# Patient Record
Sex: FEMALE | Race: WHITE | NOT HISPANIC OR LATINO | Employment: OTHER | URBAN - METROPOLITAN AREA
[De-identification: names, ages, dates, MRNs, and addresses within clinical notes are randomized per-mention and may not be internally consistent; named-entity substitution may affect disease eponyms.]

---

## 2023-08-01 ENCOUNTER — EVALUATION (OUTPATIENT)
Dept: PHYSICAL THERAPY | Facility: CLINIC | Age: 73
End: 2023-08-01
Payer: MEDICARE

## 2023-08-01 DIAGNOSIS — M25.511 ACUTE PAIN OF RIGHT SHOULDER: Primary | ICD-10-CM

## 2023-08-01 DIAGNOSIS — S46.011D STRAIN OF RIGHT ROTATOR CUFF CAPSULE, SUBSEQUENT ENCOUNTER: ICD-10-CM

## 2023-08-01 DIAGNOSIS — M89.9 SCAPULAR DYSFUNCTION: ICD-10-CM

## 2023-08-01 PROCEDURE — 97110 THERAPEUTIC EXERCISES: CPT

## 2023-08-01 PROCEDURE — 97161 PT EVAL LOW COMPLEX 20 MIN: CPT

## 2023-08-01 NOTE — PROGRESS NOTES
PT Evaluation     Today's date: 2023  Patient name: Lars Francis  : 1950  MRN: [id number]  Referring provider: Anel Green MD  Dx:   Encounter Diagnosis     ICD-10-CM    1. Acute pain of right shoulder  M25.511       2. Scapular dysfunction  M89.9       3. Strain of right rotator cuff capsule, subsequent encounter  S46.011D           Start Time: 1315  Stop Time: 1415  Total time in clinic (min): 60 minutes    Assessment  Assessment details: Lars Francis is a 71 y.o. female present to PT with CC of right shoulder pain beginning ~two weeks ago after playing tennis. Patient is an active individual who enjoys tennis, pickleball, golfing, and biking. Key findings from the physical exam include moderate scapular winging on the R, pain with OH motions, and positive R Lift-off test. The patient's presentation is consistent with scapular dyskinesis due to scapular winging resulting in minor rotator cuff tendonitis. The patient's current condition results in the below impairments and functional impairments, most notably impeding the patient's ability to perform activities around the house and engage in desired recreational sports. Therefore, the patient would benefit from skilled PT to strengthen and stabilize the shoulder/periscapular musculature to improve scapular mobility resulting in improved scapular humeral rhythm and reducing impingement/risk of future injury. The patient was educated on current diagnosis, prognosis, POC, and HEP.      Impairments: abnormal coordination, abnormal or restricted ROM, abnormal movement, impaired physical strength, lacks appropriate home exercise program, pain with function, scapular dyskinesis, poor posture  and poor body mechanics  Functional limitations: inability to play tennis; inability to perform OH motions; inability to open/close car door; inability to ride bike comfortably; inability to get comfortable at night; difficulty with household chores; difficulty reaching items off shelf;   Symptom irritability: lowUnderstanding of Dx/Px/POC: good   Prognosis: good    Goals  ST. In one week, the patient will demo independence with HEP to optimize patient outcomes b/t sessions. 2. In two weeks the patient will be able to tolerate R shoulder abduction as noted by no increase in pain (<2/10) to promote ability to return to reaching/OH motions. 3. In four weeks, patient will demo independence with postural awareness as observed by decreased verbal/tactile cueing needed throughout the session in order to perform exercises with proper form and improve carry-over while at home  4. In four weeks, patient will improve ability to perform R shoulder IR as demonstrated by pain (<1/10) or weakness on R side compared to L to improve ability to reach in back seat of car/reach behind self. LTG: (6 weeks)  1. By discharge, patient will be able to perform OH motions with improved shoulder mechanics and coordination as observed my minimal or less scapular winging. 2. By discharge, patient will be able to perform heavy household chores with less difficulty as measured by improved FOTO score of no difficulty. 3. By discharge, patient will be able to reach an object that is shoulder height with less difficulty as measured by improved FOTO score of no difficulty. 4. By discharge, patient will be able to perform tennis rafat/backhand repeatedly with no pain to improve ability to return to desired activities.     Plan  Patient would benefit from: PT eval and skilled physical therapy  Referral necessary: No  Planned modality interventions: cryotherapy, TENS and thermotherapy: hydrocollator packs  Planned therapy interventions: joint mobilization, activity modification, ADL training, manual therapy, massage, Betts taping, motor coordination training, body mechanics training, neuromuscular re-education, patient education, postural training, strengthening, stretching, therapeutic activities, therapeutic exercise, therapeutic training, graded activity, graded exercise, graded motor, home exercise program, coordination, flexibility and functional ROM exercises  Frequency: 2x week  Duration in weeks: 6  Treatment plan discussed with: patient        Subjective Evaluation    History of Present Illness  Date of onset: 2023  Mechanism of injury: Pt reports the pain began two Sundays ago while playing tennis. The patient explains she was playing tennis and serving vigorously. The patient enjoys playing tennis ~4x/week and has started to play pickleball. The patient describes that later that evening she felt fine, but when she woke up Monday morning she was unable to lift her arm (abduct) and had difficulty closing a car door due to pain and arm feeling like it would not complete the movement. The patient reports waiting a week to see if the pain would resolve on its own, but eventually saw Dr. Mariama Dorantes at [facility] where she received an anti-inflammatory injection to the R shoulder. The patient has felt no relief from the shot. In addition, this past  the patient hit tennis balls for ~20 minutes with ball machine and reports immediate pain that she powered through. The patient's current presentation includes pain with lifting the arm, occasional clicking and catching, discomfort at night due to sleeping on it, and inability to play tennis pain free or at full capacity.                  Not a recurrent problem   Patient Goals  Patient goals for therapy: return to sport/leisure activities, increased motion, decreased pain, increased strength and independence with ADLs/IADLs  Patient goal: Return to tennis; return to pickle ball; return to riding bike pain-free  Pain  Current pain ratin  At best pain ratin  At worst pain ratin  Location: on lateral arm; through R shoulder joint    Quality: sharp (feels out of place)  Relieving factors: change in position, rest and support (Stretching )  Aggravating factors: lifting and overhead activity    Social Support  Steps to enter house: yes  2  Stairs in house: yes   13  Lives in: multiple-level home  Lives with: spouse ( and dogs)    Employment status: not working  Hand dominance: right    Treatments  Previous treatment: injection treatment  Current treatment: injection treatment and physical therapy        Objective     Static Posture     Head  Forward. Scapulae  Right anteriorly tipped and right depressed. Postural Observations  Seated posture: fair  Standing posture: fair        Observations     Additional Observation Details  Right shoulder presents slightly more depressed than L in sitting    Palpation     Right   No palpable tenderness to the biceps, infraspinatus, middle deltoid, supraspinatus, teres major, teres minor and triceps. Tenderness     Right Shoulder  No tenderness     Cervical/Thoracic Screen   Cervical range of motion within normal limits  Thoracic range of motion within normal limits    Active Range of Motion   Left Shoulder   Normal active range of motion    Right Shoulder   Flexion: WFL  Extension: WFL  Abduction: with pain  External rotation 90°: WFL  External rotation BTH: WFL  Internal rotation 90°: WFL  Internal rotation BTB: with pain    Passive Range of Motion     Right Shoulder   Abduction: Right shoulder passive abduction: Tightness; observable clicking. Scapular Mobility     Right Shoulder   Scapular Dyskinesis: grade I  Scapular mobility: poor  Scapular Mobility with Shoulder to 90° FF   Scapular winging: moderate    Scapular Mobility beyond 90° FF   Scapular winging: moderate    Joint Play     Right Shoulder  Joints within functional limits are the anterior capsule and inferior capsule. Hypomobile in the posterior capsule.      Strength/Myotome Testing     Left Shoulder   Normal muscle strength    Right Shoulder     Planes of Motion   Flexion: 4+   Abduction: 4+   External rotation at 0°: 4+     Tests     Right Shoulder   Positive lift-off and scapular assistance . Negative empty can, full can, Hawkin's, horn blower, painful arc and Speed's. Precautions: History reviewed. No pertinent past medical history. HEP:   Access Code: [email or web address]       Patient treated by ASHLEY Mcallister under my direct supervision.     Manuals 8/1/23            Visit IE            PA mobilizations             PROM                          Neuro Re-Ed             Rows YTB HEP VC/TC            Shoulder Extension  YTB HEP VC/TC            Isometrics              Resisted IR/ER             [employer]              Touch-downs              Planks             Shoulder Perturbations              Ther Ex             IR cane stretch  HEP 5x10"            Doorway stretch                                                                                           Ther Activity                                       Gait Training                                       Modalities

## 2023-08-01 NOTE — LETTER
2023    Anel Green MD  [address]    Patient: Lars Francis   YOB: 1950   Date of Visit: 2023     Encounter Diagnosis     ICD-10-CM    1. Acute pain of right shoulder  M25.511       2. Scapular dysfunction  M89.9       3. Strain of right rotator cuff capsule, subsequent encounter  S46.011D           Dear Dr. Green: Thank you for your recent referral of Lars Francis. Please review the attached evaluation summary from Hazel's recent visit. Please verify that you agree with the plan of care by signing the attached order. If you have any questions or concerns, please do not hesitate to call. I sincerely appreciate the opportunity to share in the care of one of your patients and hope to have another opportunity to work with you in the near future. Sincerely,    Renan Plummer, PT      Referring Provider:      I certify that I have read the below Plan of Care and certify the need for these services furnished under this plan of treatment while under my care. Anel Green MD  [address]  Via Fax: [phone]          PT Evaluation     Today's date: 2023  Patient name: Lars Francis  : 1950  MRN: [id number]  Referring provider: Anel Green MD  Dx:   Encounter Diagnosis     ICD-10-CM    1. Acute pain of right shoulder  M25.511       2. Scapular dysfunction  M89.9       3. Strain of right rotator cuff capsule, subsequent encounter  S46.011D           Start Time: 1315  Stop Time: 1415  Total time in clinic (min): 60 minutes    Assessment  Assessment details: Lars Francis is a 71 y.o. female present to PT with CC of right shoulder pain beginning ~two weeks ago after playing tennis. Patient is an active individual who enjoys tennis, pickleball, golfing, and biking.  Key findings from the physical exam include moderate scapular winging on the R, pain with OH motions, and positive R Lift-off test. The patient's presentation is consistent with scapular dyskinesis due to scapular winging resulting in minor rotator cuff tendonitis. The patient's current condition results in the below impairments and functional impairments, most notably impeding the patient's ability to perform activities around the house and engage in desired recreational sports. Therefore, the patient would benefit from skilled PT to strengthen and stabilize the shoulder/periscapular musculature to improve scapular mobility resulting in improved scapular humeral rhythm and reducing impingement/risk of future injury. The patient was educated on current diagnosis, prognosis, POC, and HEP. Impairments: abnormal coordination, abnormal or restricted ROM, abnormal movement, impaired physical strength, lacks appropriate home exercise program, pain with function, scapular dyskinesis, poor posture  and poor body mechanics  Functional limitations: inability to play tennis; inability to perform OH motions; inability to open/close car door; inability to ride bike comfortably; inability to get comfortable at night; difficulty with household chores; difficulty reaching items off shelf;   Symptom irritability: lowUnderstanding of Dx/Px/POC: good   Prognosis: good    Goals  ST. In one week, the patient will demo independence with HEP to optimize patient outcomes b/t sessions. 2. In two weeks the patient will be able to tolerate R shoulder abduction as noted by no increase in pain (<2/10) to promote ability to return to reaching/OH motions. 3. In four weeks, patient will demo independence with postural awareness as observed by decreased verbal/tactile cueing needed throughout the session in order to perform exercises with proper form and improve carry-over while at home  4.  In four weeks, patient will improve ability to perform R shoulder IR as demonstrated by pain (<1/10) or weakness on R side compared to L to improve ability to reach in back seat of car/reach behind self. LTG: (6 weeks)  1. By discharge, patient will be able to perform OH motions with improved shoulder mechanics and coordination as observed my minimal or less scapular winging. 2. By discharge, patient will be able to perform heavy household chores with less difficulty as measured by improved FOTO score of no difficulty. 3. By discharge, patient will be able to reach an object that is shoulder height with less difficulty as measured by improved FOTO score of no difficulty. 4. By discharge, patient will be able to perform tennis rafat/backhand repeatedly with no pain to improve ability to return to desired activities. Plan  Patient would benefit from: PT eval and skilled physical therapy  Referral necessary: No  Planned modality interventions: cryotherapy, TENS and thermotherapy: hydrocollator packs  Planned therapy interventions: joint mobilization, activity modification, ADL training, manual therapy, massage, Betts taping, motor coordination training, body mechanics training, neuromuscular re-education, patient education, postural training, strengthening, stretching, therapeutic activities, therapeutic exercise, therapeutic training, graded activity, graded exercise, graded motor, home exercise program, coordination, flexibility and functional ROM exercises  Frequency: 2x week  Duration in weeks: 6  Treatment plan discussed with: patient        Subjective Evaluation    History of Present Illness  Date of onset: 7/23/2023  Mechanism of injury: Pt reports the pain began two Sundays ago while playing tennis. The patient explains she was playing tennis and serving vigorously. The patient enjoys playing tennis ~4x/week and has started to play pickleball. The patient describes that later that evening she felt fine, but when she woke up Monday morning she was unable to lift her arm (abduct) and had difficulty closing a car door due to pain and arm feeling like it would not complete the movement. The patient reports waiting a week to see if the pain would resolve on its own, but eventually saw Dr. Noemi Baltazar at [facility] where she received an anti-inflammatory injection to the R shoulder. The patient has felt no relief from the shot. In addition, this past  the patient hit tennis balls for ~20 minutes with ball machine and reports immediate pain that she powered through. The patient's current presentation includes pain with lifting the arm, occasional clicking and catching, discomfort at night due to sleeping on it, and inability to play tennis pain free or at full capacity. Not a recurrent problem   Patient Goals  Patient goals for therapy: return to sport/leisure activities, increased motion, decreased pain, increased strength and independence with ADLs/IADLs  Patient goal: Return to tennis; return to pickle ball; return to riding bike pain-free  Pain  Current pain ratin  At best pain ratin  At worst pain ratin  Location: on lateral arm; through R shoulder joint    Quality: sharp (feels out of place)  Relieving factors: change in position, rest and support (Stretching )  Aggravating factors: lifting and overhead activity    Social Support  Steps to enter house: yes  2  Stairs in house: yes   13  Lives in: multiple-level home  Lives with: spouse ( and dogs)    Employment status: not working  Hand dominance: right    Treatments  Previous treatment: injection treatment  Current treatment: injection treatment and physical therapy        Objective     Static Posture     Head  Forward. Scapulae  Right anteriorly tipped and right depressed. Postural Observations  Seated posture: fair  Standing posture: fair        Observations     Additional Observation Details  Right shoulder presents slightly more depressed than L in sitting    Palpation     Right   No palpable tenderness to the biceps, infraspinatus, middle deltoid, supraspinatus, teres major, teres minor and triceps. Tenderness     Right Shoulder  No tenderness     Cervical/Thoracic Screen   Cervical range of motion within normal limits  Thoracic range of motion within normal limits    Active Range of Motion   Left Shoulder   Normal active range of motion    Right Shoulder   Flexion: WFL  Extension: WFL  Abduction: with pain  External rotation 90°: WFL  External rotation BTH: WFL  Internal rotation 90°: WFL  Internal rotation BTB: with pain    Passive Range of Motion     Right Shoulder   Abduction: Right shoulder passive abduction: Tightness; observable clicking. Scapular Mobility     Right Shoulder   Scapular Dyskinesis: grade I  Scapular mobility: poor  Scapular Mobility with Shoulder to 90° FF   Scapular winging: moderate    Scapular Mobility beyond 90° FF   Scapular winging: moderate    Joint Play     Right Shoulder  Joints within functional limits are the anterior capsule and inferior capsule. Hypomobile in the posterior capsule. Strength/Myotome Testing     Left Shoulder   Normal muscle strength    Right Shoulder     Planes of Motion   Flexion: 4+   Abduction: 4+   External rotation at 0°: 4+     Tests     Right Shoulder   Positive lift-off and scapular assistance . Negative empty can, full can, Hawkin's, horn blower, painful arc and Speed's. Precautions: History reviewed. No pertinent past medical history. HEP:   Access Code: [email or web address]       Patient treated by ASHLEY Gomez under my direct supervision.     Manuals 8/1/23            Visit IE            PA mobilizations             PROM                          Neuro Re-Ed             Rows YTB HEP VC/TC            Shoulder Extension  YTB HEP VC/TC            Isometrics              Resisted IR/ER             [employer] [facility] [place]              Touch-downs              Planks             Shoulder Perturbations              Ther Ex             IR cane stretch  HEP 5x10"            Doorway stretch Ther Activity                                       Gait Training                                       Modalities

## 2023-08-03 ENCOUNTER — OFFICE VISIT (OUTPATIENT)
Dept: PHYSICAL THERAPY | Facility: CLINIC | Age: 73
End: 2023-08-03
Payer: MEDICARE

## 2023-08-03 DIAGNOSIS — M25.511 ACUTE PAIN OF RIGHT SHOULDER: Primary | ICD-10-CM

## 2023-08-03 DIAGNOSIS — S46.011D STRAIN OF RIGHT ROTATOR CUFF CAPSULE, SUBSEQUENT ENCOUNTER: ICD-10-CM

## 2023-08-03 DIAGNOSIS — M89.9 SCAPULAR DYSFUNCTION: ICD-10-CM

## 2023-08-03 PROCEDURE — 97112 NEUROMUSCULAR REEDUCATION: CPT

## 2023-08-03 NOTE — PROGRESS NOTES
Daily Note     Today's date: 8/3/2023  Patient name: Filippo Cisse  : 1950  MRN: [id number]  Referring provider: Sosa Green MD  Dx:   Encounter Diagnosis     ICD-10-CM    1. Acute pain of right shoulder  M25.511       2. Scapular dysfunction  M89.9       3. Strain of right rotator cuff capsule, subsequent encounter  S46.011D           Start Time: 1400  Stop Time: 1447  Total time in clinic (min): 47 minutes    Subjective: Patient reports feeling shoulder feels the same with not too much pain and reports of it getting stuck during elevation motions. The patient noted some soreness in her back/between shoulder blades. Patient felt HEP was good and that her muscles were tired, but there was no pain. Objective: See treatment diary below      Assessment: Tolerated treatment well as demonstrated by ability to progress shoulder and periscapular interventions without any pain. The patient continued to demo notable scapular winging and impaired coordination on the R shoulder compared to the L. In addition, the patient required verbal and tactile cueing to engage middle/lower trap and serratus anterior throughout session. Patient demonstrated fatigue post treatment and would benefit from continued PT to improve shoulder girdle mechanics and coordination, especially in OH ranges, needed for ADLs and safe participation in desired recreational activities. Plan: Continue per plan of care. Progress treatment as tolerated. Precautions: History reviewed. No pertinent past medical history. HEP:   Access Code: [email or web address]   - updated on 8/3/23      Patient treated by ASHLEY Shahid under my direct supervision.     Manuals 8/3/23 8/1/23     Visit 2 IE     PA mobilizations       PROM              Neuro Re-Ed       Rows YTB 3x10 VC/TC YTB HEP VC/TC     Shoulder Extension   YTB HEP VC/TC     Isometrics        Resisted IR/ER ER YTB 3x10 (towel)      [place]  Unweighted 10x      Shoulder extension to abduction  Yellow handle 2x10      Touch-downs  3x10 at wall VC      Planks       Serratus Push-up 3x10 at mat VC/TC      Shoulder Perturbations        Ther Ex       IR cane stretch  8x10" HEP 5x10"     Doorway stretch       Is, Ts, Ys 2x10 (on mat)      Assessment and Management  Provided education on shoulder anatomy and importance of stabilization muscles for shoulder coordination                                   Ther Activity                     Gait Training                     Modalities

## 2023-08-08 ENCOUNTER — OFFICE VISIT (OUTPATIENT)
Dept: PHYSICAL THERAPY | Facility: CLINIC | Age: 73
End: 2023-08-08
Payer: MEDICARE

## 2023-08-08 DIAGNOSIS — S46.011D STRAIN OF RIGHT ROTATOR CUFF CAPSULE, SUBSEQUENT ENCOUNTER: ICD-10-CM

## 2023-08-08 DIAGNOSIS — M89.9 SCAPULAR DYSFUNCTION: ICD-10-CM

## 2023-08-08 DIAGNOSIS — M25.511 ACUTE PAIN OF RIGHT SHOULDER: Primary | ICD-10-CM

## 2023-08-08 PROCEDURE — 97110 THERAPEUTIC EXERCISES: CPT

## 2023-08-08 PROCEDURE — 97112 NEUROMUSCULAR REEDUCATION: CPT

## 2023-08-08 NOTE — PROGRESS NOTES
Daily Note     Today's date: 2023  Patient name: America Herbert  : 1950  MRN: [id number]  Referring provider: Shahana Green MD  Dx:   Encounter Diagnosis     ICD-10-CM    1. Acute pain of right shoulder  M25.511       2. Scapular dysfunction  M89.9       3. Strain of right rotator cuff capsule, subsequent encounter  S46.011D           Start Time: 1405  Stop Time: 1451  Total time in clinic (min): 46 minutes    Subjective: Patient reports feeling good today and that she played tennis yesterday (predominantly ground strokes, no OH serves) and felt sore for the first 10 minutes, but was able to play for 1.5 hours without difficulty. The patient vacuumed her home today and felt discomfort in her shoulder while performing the task. Objective: See treatment diary below      Assessment: Tolerated treatment well as demonstrated by improved form and function with shoulder and scapular interventions. The patient continues to demonstrate scapular dysfunction, most notable in >90 degrees of shoulder elevation, however has demonstrated improved form today compared to last session. The patient continues to require verbal and tactile cueing to improve shoulder coordination and elicit the desire muscle response needed for stability of the shoulder girdle. Patient demonstrated fatigue post treatment, exhibited good technique with therapeutic exercises and would benefit from continued PT to improve OH shoulder stability to return to tennis and overhead exercises as PLOF with decreased risk of re-injury. Plan: Continue per plan of care. Progress treatment as tolerated. Precautions: History reviewed. No pertinent past medical history. HEP:   Access Code: [email or web address]   - updated on 23      Patient treated by ASHLEY Pedro under my direct supervision.     Manuals 8/8/23 8/3/23 8/1/23     Visit 3 2 IE     PA mobilizations        PROM                Neuro Re-Ed        Rows Y handle 3x10 YTB 3x10 VC/TC YTB HEP VC/TC     Shoulder Extension    YTB HEP VC/TC     Isometrics         Resisted IR/ER ER/IR Y handles 2x10 (towel) ER YTB 3x10 (towel)      [place]  Unweighted at wall 2x10 (VC and TC) Unweighted 10x      Shoulder extension to abduction   Yellow handle 2x10      Touch-downs  3x8 at wall with YTB emphasis on OH portion (VC) 3x10 at wall VC      Planks        Serratus Push-up 8x;  Serratus punch in standing 2x8 (patient was able to feel more SA activation) 3x10 at mat VC/TC      Shoulder Perturbations         Ther Ex        IR cane stretch  5x10" to review form  8x10" HEP 5x10"     Doorway stretch        Is, Ts, Ys  2x10 (on mat)      Assessment and Management   Provided education on shoulder anatomy and importance of stabilization muscles for shoulder coordination                                       Ther Activity                        Gait Training                        Modalities

## 2023-08-10 ENCOUNTER — OFFICE VISIT (OUTPATIENT)
Dept: PHYSICAL THERAPY | Facility: CLINIC | Age: 73
End: 2023-08-10
Payer: MEDICARE

## 2023-08-10 DIAGNOSIS — M25.511 ACUTE PAIN OF RIGHT SHOULDER: Primary | ICD-10-CM

## 2023-08-10 DIAGNOSIS — S46.011D STRAIN OF RIGHT ROTATOR CUFF CAPSULE, SUBSEQUENT ENCOUNTER: ICD-10-CM

## 2023-08-10 DIAGNOSIS — M89.9 SCAPULAR DYSFUNCTION: ICD-10-CM

## 2023-08-10 PROCEDURE — 97112 NEUROMUSCULAR REEDUCATION: CPT

## 2023-08-10 NOTE — PROGRESS NOTES
Daily Note     Today's date: 8/10/2023  Patient name: Deep Landers  : 1950  MRN: [id number]  Referring provider: Radha Green MD  Dx:   Encounter Diagnosis     ICD-10-CM    1. Acute pain of right shoulder  M25.511       2. Scapular dysfunction  M89.9       3. Strain of right rotator cuff capsule, subsequent encounter  S46.011D           Start Time: 1403  Stop Time: 1447  Total time in clinic (min): 44 minutes    Subjective: The patient reports that her shoulder is fine but notices it is still making more sounds/popping. The clicking and catching is nonpainful. The patient played tennis Monday and will play pickle-ball tomorrow. Patient felt a tiny bit sore in her back following the last PT session. Objective: See treatment diary below      Assessment: Tolerated treatment fair as demonstrated by ability to progress OH stability interventions and perform cross-body motions (tennis simulation) without an increase in pain. The patient continues to demonstrate scapular dysfunction and requires frequent verbal and tactile cueing to perform interventions with proper scapular humeral rhythm and serratus anterior activation. The patient demonstrated difficulty with coordinating the rotator cuff during resisted ER, therefore the patient performed isometrics in order to elicit the proper muscular contraction prior to progressing the intervention. Patient demonstrated fatigue post treatment and would benefit from continued PT to improve shoulder girdle stability needed for safe body mechanics with [place] tennis serves and weight-lifting. Plan: Continue per plan of care. Progress treatment as tolerated. Precautions: History reviewed. No pertinent past medical history. HEP:   Access Code: [email or web address]   - updated on 8/10/23      Patient treated by ASHLEY Bueno under my direct supervision.     Manuals 8/10/23 8/8/23 8/3/23 8/1/23     Visit 4 3 2 IE     PA mobilizations         PROM                  Neuro Re-Ed         Rows Y handle 2x12 Y handle 3x10 YTB 3x10 VC/TC YTB HEP VC/TC     Shoulder Extension     YTB HEP VC/TC     Isometrics  3x10 3s hold; VC/TC for posterior RC activation        Resisted IR/ER 2x10 ER with Y handle (inability to feel in posterior shoulder; demonstrated reliance on biceps) ER/IR Y handles 2x10 (towel) ER YTB 3x10 (towel)      [place]   Unweighted at wall 2x10 (VC and TC) Unweighted 10x      Shoulder extension to abduction    Yellow handle 2x10      Touch-downs  4x5 at wall and YTB (pulses 2x and tighter YTB for 3x)  3x8 at wall with YTB emphasis on OH portion (VC) 3x10 at wall VC      Planks         Serratus Push-up Push-up plus 3x5; TC 8x; Serratus punch in standing 2x8 (patient was able to feel more SA activation) 3x10 at mat VC/TC      D2 flexion  D2 flexion for cross-body tennis simulation; at wall 2# 3x10 (VC)        Shoulder Perturbations          Ther Ex         IR cane stretch  5x10" 5x10" to review form  8x10" HEP 5x10"     Doorway stretch         Is, Ts, Ys   2x10 (on mat)      Assessment and Management    Provided education on shoulder anatomy and importance of stabilization muscles for shoulder coordination                                           Ther Activity                           Gait Training                           Modalities

## 2023-08-14 ENCOUNTER — OFFICE VISIT (OUTPATIENT)
Dept: PHYSICAL THERAPY | Facility: CLINIC | Age: 73
End: 2023-08-14
Payer: MEDICARE

## 2023-08-14 DIAGNOSIS — S46.011D STRAIN OF RIGHT ROTATOR CUFF CAPSULE, SUBSEQUENT ENCOUNTER: ICD-10-CM

## 2023-08-14 DIAGNOSIS — M89.9 SCAPULAR DYSFUNCTION: ICD-10-CM

## 2023-08-14 DIAGNOSIS — M25.511 ACUTE PAIN OF RIGHT SHOULDER: Primary | ICD-10-CM

## 2023-08-14 PROCEDURE — 97112 NEUROMUSCULAR REEDUCATION: CPT

## 2023-08-14 PROCEDURE — 97110 THERAPEUTIC EXERCISES: CPT

## 2023-08-14 NOTE — PROGRESS NOTES
Daily Note     Today's date: 2023  Patient name: Xiomara Moulton  : 1950  MRN: [id number]  Referring provider: Prisca Green MD  Dx:   Encounter Diagnosis     ICD-10-CM    1. Acute pain of right shoulder  M25.511       2. Scapular dysfunction  M89.9       3. Strain of right rotator cuff capsule, subsequent encounter  S46.011D           Start Time: 1402  Stop Time: 1446  Total time in clinic (min): 44 minutes    Subjective: Patient played tennis for 1.5 hours and did OH swinging, but did not do any overhead serves. Patient played pickle ball Friday and felt sore. The patient has been completing HEP and feels benefit from the isometrics. The patient will be away the next week or two for vacation. Objective: See treatment diary below      Assessment: Tolerated treatment well as demonstrated by ability to perform OH interventions with improved scapular coordination and no pain or discomfort. The patient continues to have difficulty with performing shoulder ER and requires significant verbal and tactile cueing to elicit the correct muscle response. The patient needs to be able to perform swinging motions at 90 degrees abduction and ER, therefore the patient would benefit from skilled PT to focus on OH strengthening/stability in these unstable positions to reduce rise of re-injury. Plan: Continue per plan of care. Progress treatment as tolerated. Precautions: History reviewed. No pertinent past medical history. HEP:   Access Code: [email or web address]   - updated on 8/10/23      Patient treated by ASHLEY Fields under my direct supervision.     Manuals 8/14/23 8/10/23 8/8/23 8/3/23 8/1/23     Visit 5 4 3 2 IE     PA mobilizations          PROM                    Neuro Re-Ed          Rows  Y handle 2x12 Y handle 3x10 YTB 3x10 VC/TC YTB HEP VC/TC     Shoulder Extension      YTB HEP VC/TC     Isometrics  2x10 3s hold; VC/TC 3x10 3s hold; VC/TC for posterior RC activation        Resisted IR/ER Supine ER with TC 2x10 2# 3s hold  2x10 ER with Y handle (inability to feel in posterior shoulder; demonstrated reliance on biceps) ER/IR Y handles 2x10 (towel) ER YTB 3x10 (towel)      [place]    Unweighted at wall 2x10 (VC and TC) Unweighted 10x      Shoulder extension to abduction     Yellow handle 2x10      Touch-downs  3x10 at wall YTB with straight arms  4x5 at wall and YTB (pulses 2x and tighter YTB for 3x)  3x8 at wall with YTB emphasis on OH portion (VC) 3x10 at wall VC      Planks          Serratus Push-up Push-up plus 2x10 TC Push-up plus 3x5; TC 8x; Serratus punch in standing 2x8 (patient was able to feel more SA activation) 3x10 at mat VC/TC      D2 flexion  D2 flexion at wall 2# (VC) 3x10 D2 flexion for cross-body tennis simulation; at wall 2# 3x10 (VC)        Shoulder Perturbations  OH w/ YTB on wall 3x10          Ther Ex          IR cane stretch  2x20s 5x10" 5x10" to review form  8x10" HEP 5x10"     Doorway stretch          Is, Ts, Ys    2x10 (on mat)      Assessment and Management     Provided education on shoulder anatomy and importance of stabilization muscles for shoulder coordination                                               Ther Activity                              Gait Training                              Modalities

## 2023-10-02 ENCOUNTER — EVALUATION (OUTPATIENT)
Dept: PHYSICAL THERAPY | Facility: CLINIC | Age: 73
End: 2023-10-02
Payer: MEDICARE

## 2023-10-02 DIAGNOSIS — M25.552 LEFT HIP PAIN: Primary | ICD-10-CM

## 2023-10-02 DIAGNOSIS — M76.892 TENDINITIS OF LEFT HIP FLEXOR: ICD-10-CM

## 2023-10-02 PROCEDURE — 97161 PT EVAL LOW COMPLEX 20 MIN: CPT

## 2023-10-02 PROCEDURE — 97110 THERAPEUTIC EXERCISES: CPT

## 2023-10-02 NOTE — LETTER
2023    Orlando Green MD  [address]    Patient: Tennille Coburn   YOB: 1950   Date of Visit: 10/2/2023     Encounter Diagnosis     ICD-10-CM    1. Left hip pain  M25.552       2. Tendinitis of left hip flexor  N69.340           Dear Dr. Green: Thank you for your recent referral of Tennille Coburn. Please review the attached evaluation summary from Ahzel's recent visit. Please verify that you agree with the plan of care by signing the attached order. If you have any questions or concerns, please do not hesitate to call. I sincerely appreciate the opportunity to share in the care of one of your patients and hope to have another opportunity to work with you in the near future. Sincerely,    Guerita Garcia, PT      Referring Provider:      I certify that I have read the below Plan of Care and certify the need for these services furnished under this plan of treatment while under my care. Orlando Green MD  [address]  [address]          PT Evaluation     Today's date: 10/2/2023  Patient name: Tennille Coburn  : 1950  MRN: [id number]  Referring provider: Orlando Green MD  Dx:   Encounter Diagnosis     ICD-10-CM    1. Left hip pain  M25.552       2. Tendinitis of left hip flexor  M76.892           Start Time: 7  Stop Time: 1022  Total time in clinic (min): 43 minutes    Assessment  Assessment details: Pt is a 71 y.o female who presents to skilled physical therapy with complaints of acute L hip pain that started approximately 3 weeks ago. Pt demonstrated slightly antalgic gait pattern, impaired functional mechanics and balance, tightness of her L hip flexors, tenderness of her L hip flexors and posterior L hip musculature, slightly decreased L hip MMT compared to the R hip, and a positive ISIDRO and Levon test. Pt's hx and clinical findings are consistent with an overuse injury and tendonitis of the L hip flexors.  Pt was educated on her diagnosis, prognosis, POC, and HEP. Pt's pain and deficits are preventing her from performing self care, ADLs, and recreational activities without compensations. Pt would benefit from skilled physical therapy to reduce her pain, address her deficits, progress towards her goals, and return to her PLOF. Impairments: abnormal gait, abnormal or restricted ROM, activity intolerance, impaired balance, impaired physical strength, lacks appropriate home exercise program, pain with function, poor posture  and poor body mechanics    Symptom irritability: lowUnderstanding of Dx/Px/POC: good   Prognosis: good    Goals  Short Term Goals:  1) Pt will initiate and progress her HEP in 3-4 weeks. 2) Pt will improve her gait to [facility] and pain free in 3-4 weeks. 3) Pt will improve her hip MMT to at least 4/5 without pain in 3-4 weeks to improve ADLs. Long Term Goals:  1) Pt will be able to ambulate for at least 30 mins without pain in 6-8 weeks. 2) Pt will be able to ambulate up and down a full flight of steps without pain in 6-8 weeks. 3) Pt will be able to perform ADLs without pain in 6-8 weeks. 4) Pt will be able to play pickleball and tennis for at least 30 mins without pain in 6-8 weeks.     Plan  Patient would benefit from: skilled physical therapy and PT eval  Planned modality interventions: cryotherapy and thermotherapy: hydrocollator packs  Planned therapy interventions: activity modification, ADL retraining, joint mobilization, manual therapy, balance, body mechanics training, motor coordination training, neuromuscular re-education, patient education, coordination, self care, strengthening, stretching, therapeutic activities, therapeutic exercise, flexibility, functional ROM exercises, gait training, graded exercise and home exercise program  Frequency: 2x week  Duration in weeks: 8  Treatment plan discussed with: patient        Subjective Evaluation    History of Present Illness  Mechanism of injury: Pt has been having some L hip pain. Had an x-ray and was told it looks good. Pt feels she is having this hip pain because of over-use. She received an injection in her hip, she was having a lot of pain afterward. But now she feels she is able to do a lot more. Before she wasn't able to put a lot of weight on her leg. Pain is around her ASIS after her injection and down into her hip flexor. Pt is still playing a lot of tennis and pickleball. Pt denies numbness/tingling. The injection site is still red, little concerned about it. Pt denies any low back or knee pain. Pt was doing over 22k steps, walking her dogs 2 miles. Patient Goals  Patient goals for therapy: decreased pain, increased motion, increased strength, independence with ADLs/IADLs and return to sport/leisure activities    Pain  Current pain ratin  At best pain ratin  At worst pain ratin  Location: anterior hip  Quality: tight and dull ache  Relieving factors: medications and heat  Aggravating factors: stair climbing and running (getting in and out of the car)  Progression: improved    Social Support    Employment status: not working    Diagnostic Tests  X-ray: normal        Objective     Concurrent Complaints  Negative for night pain and disturbed sleep    Postural Observations  Seated posture: poor  Standing posture: fair  Correction of posture: has no consistent effect        Palpation   Left   Tenderness of the iliopsoas, obturator externus, piriformis and TFL. Tenderness     Left Hip   Tenderness in the ASIS and greater trochanter.      Active Range of Motion     Lumbar   Flexion:  WFL  Extension:  WFL  Left lateral flexion:  WFL  Right lateral flexion:  WFL  Left rotation:  WFL  Right rotation:  [facility]    Strength/Myotome Testing     Left Hip   Planes of Motion   Flexion: 4-  Extension: 4-  Abduction: 4-  Adduction: 3+  External rotation: 4+  Internal rotation: 4+    Right Hip   Planes of Motion   Flexion: 4+  Extension: 4  Abduction: 4  Adduction: 4-  External rotation: 4+  Internal rotation: 4+    Tests     Left Hip   Positive ISIDRO. Negative FADIR and scour. Levon: Positive. Right Hip   Negative ISIDRO, FADIR and scour. Levon: Negative. Ambulation   Weight-Bearing Status   Weight-Bearing Status (Left): full weight bearing   Weight-Bearing Status (Right): full weight-bearing    Assistive device used: none    Observational Gait   Gait: antalgic   Walking speed and stride length within functional limits. Functional Assessment      Squat    Left tibial anterior translation beyond toes and right tibial anterior translation beyond toes. Forward Step Up 8"   Left Leg  Pain. Right Leg  Within functional limits.      Forward Step Down 8"   Left Leg  Pain and contralateral toe touch first.     Single Leg Stance   Left: 30 seconds  Right: 30 seconds    Single Leg Stance - Eyes Closed   Left  Trial 1: 3 seconds    Right  Trial 1: 5 seconds              Precautions: None  HEP: Access Code [facility]    Date     10/2/23   Visit Number     1 (IE)   Manuals                                        Neuro Re-Ed         bridges                                                        Ther Ex        Supine hip flex str        ISIDRO str        Piriformis str                                                Ther Activity                        Gait Training                        Modalities

## 2023-10-02 NOTE — PROGRESS NOTES
PT Evaluation     Today's date: 10/2/2023  Patient name: Ana Leon  : 1950  MRN: [id number]  Referring provider: Reyna Green MD  Dx:   Encounter Diagnosis     ICD-10-CM    1. Left hip pain  M25.552       2. Tendinitis of left hip flexor  M76.892           Start Time: 7400  Stop Time: 1022  Total time in clinic (min): 43 minutes    Assessment  Assessment details: Pt is a 71 y.o female who presents to skilled physical therapy with complaints of acute L hip pain that started approximately 3 weeks ago. Pt demonstrated slightly antalgic gait pattern, impaired functional mechanics and balance, tightness of her L hip flexors, tenderness of her L hip flexors and posterior L hip musculature, slightly decreased L hip MMT compared to the R hip, and a positive ISIDRO and Levon test. Pt's hx and clinical findings are consistent with an overuse injury and tendonitis of the L hip flexors. Pt was educated on her diagnosis, prognosis, POC, and HEP. Pt's pain and deficits are preventing her from performing self care, ADLs, and recreational activities without compensations. Pt would benefit from skilled physical therapy to reduce her pain, address her deficits, progress towards her goals, and return to her PLOF. Impairments: abnormal gait, abnormal or restricted ROM, activity intolerance, impaired balance, impaired physical strength, lacks appropriate home exercise program, pain with function, poor posture  and poor body mechanics    Symptom irritability: lowUnderstanding of Dx/Px/POC: good   Prognosis: good    Goals  Short Term Goals:  1) Pt will initiate and progress her HEP in 3-4 weeks. 2) Pt will improve her gait to [facility] and pain free in 3-4 weeks. 3) Pt will improve her hip MMT to at least 4/5 without pain in 3-4 weeks to improve ADLs. Long Term Goals:  1) Pt will be able to ambulate for at least 30 mins without pain in 6-8 weeks.   2) Pt will be able to ambulate up and down a full flight of steps without pain in 6-8 weeks. 3) Pt will be able to perform ADLs without pain in 6-8 weeks. 4) Pt will be able to play pickleball and tennis for at least 30 mins without pain in 6-8 weeks. Plan  Patient would benefit from: skilled physical therapy and PT eval  Planned modality interventions: cryotherapy and thermotherapy: hydrocollator packs  Planned therapy interventions: activity modification, ADL retraining, joint mobilization, manual therapy, balance, body mechanics training, motor coordination training, neuromuscular re-education, patient education, coordination, self care, strengthening, stretching, therapeutic activities, therapeutic exercise, flexibility, functional ROM exercises, gait training, graded exercise and home exercise program  Frequency: 2x week  Duration in weeks: 8  Treatment plan discussed with: patient        Subjective Evaluation    History of Present Illness  Mechanism of injury: Pt has been having some L hip pain. Had an x-ray and was told it looks good. Pt feels she is having this hip pain because of over-use. She received an injection in her hip, she was having a lot of pain afterward. But now she feels she is able to do a lot more. Before she wasn't able to put a lot of weight on her leg. Pain is around her ASIS after her injection and down into her hip flexor. Pt is still playing a lot of tennis and pickleball. Pt denies numbness/tingling. The injection site is still red, little concerned about it. Pt denies any low back or knee pain. Pt was doing over 22k steps, walking her dogs 2 miles.    Patient Goals  Patient goals for therapy: decreased pain, increased motion, increased strength, independence with ADLs/IADLs and return to sport/leisure activities    Pain  Current pain ratin  At best pain ratin  At worst pain ratin  Location: anterior hip  Quality: tight and dull ache  Relieving factors: medications and heat  Aggravating factors: stair climbing and running (getting in and out of the car)  Progression: improved    Social Support    Employment status: not working    Diagnostic Tests  X-ray: normal        Objective     Concurrent Complaints  Negative for night pain and disturbed sleep    Postural Observations  Seated posture: poor  Standing posture: fair  Correction of posture: has no consistent effect        Palpation   Left   Tenderness of the iliopsoas, obturator externus, piriformis and TFL. Tenderness     Left Hip   Tenderness in the ASIS and greater trochanter. Active Range of Motion     Lumbar   Flexion:  WFL  Extension:  WFL  Left lateral flexion:  WFL  Right lateral flexion:  WFL  Left rotation:  WFL  Right rotation:  [facility]    Strength/Myotome Testing     Left Hip   Planes of Motion   Flexion: 4-  Extension: 4-  Abduction: 4-  Adduction: 3+  External rotation: 4+  Internal rotation: 4+    Right Hip   Planes of Motion   Flexion: 4+  Extension: 4  Abduction: 4  Adduction: 4-  External rotation: 4+  Internal rotation: 4+    Tests     Left Hip   Positive ISIDRO. Negative FADIR and scour. Levon: Positive. Right Hip   Negative ISIDRO, FADIR and scour. Levon: Negative. Ambulation   Weight-Bearing Status   Weight-Bearing Status (Left): full weight bearing   Weight-Bearing Status (Right): full weight-bearing    Assistive device used: none    Observational Gait   Gait: antalgic   Walking speed and stride length within functional limits. Functional Assessment      Squat    Left tibial anterior translation beyond toes and right tibial anterior translation beyond toes. Forward Step Up 8"   Left Leg  Pain. Right Leg  Within functional limits.      Forward Step Down 8"   Left Leg  Pain and contralateral toe touch first.     Single Leg Stance   Left: 30 seconds  Right: 30 seconds    Single Leg Stance - Eyes Closed   Left  Trial 1: 3 seconds    Right  Trial 1: 5 seconds                Precautions: None  HEP: Access Code [facility]    Date     10/2/23   Visit Number 1 (IE)   Manuals                                        Neuro Re-Ed         bridges                                                        Ther Ex        Supine hip flex str        ISIDRO str        Piriformis str                                                Ther Activity                        Gait Training                        Modalities

## 2023-10-05 ENCOUNTER — OFFICE VISIT (OUTPATIENT)
Dept: PHYSICAL THERAPY | Facility: CLINIC | Age: 73
End: 2023-10-05
Payer: MEDICARE

## 2023-10-05 DIAGNOSIS — M76.892 TENDINITIS OF LEFT HIP FLEXOR: ICD-10-CM

## 2023-10-05 DIAGNOSIS — M25.552 LEFT HIP PAIN: Primary | ICD-10-CM

## 2023-10-05 PROCEDURE — 97112 NEUROMUSCULAR REEDUCATION: CPT

## 2023-10-05 PROCEDURE — 97110 THERAPEUTIC EXERCISES: CPT

## 2023-10-05 NOTE — PROGRESS NOTES
Daily Note     Today's date: 10/5/2023  Patient name: Shelly Guerra  : 1950  MRN: [id number]  Referring provider: No ref. provider found  Dx:   Encounter Diagnosis     ICD-10-CM    1. Left hip pain  M25.552       2. Tendinitis of left hip flexor  M76.892           Start Time:   Stop Time: 1530  Total time in clinic (min): 45 minutes    Subjective: Pt reports that she played tennis after her last treatment session, as well as played pickleball this past week, and her hip felt great. She didn't have any pain while playing and feels good prior to the start of her treatment session. Still feels that her shoulder isn't 100%. Is going to play pickleball again tomorrow. Objective: See treatment diary below      Assessment: Tolerated treatment well. Patient demonstrated fatigue post treatment, exhibited good technique with therapeutic exercises and would benefit from continued PT      Plan: Continue per plan of care. Progress treatment as tolerated.          Precautions: None  HEP: Access Code [facility]    Date     10/2/23   Visit Number     1 (IE)   Manuals                                        Neuro Re-Ed         bridges    2x10 LE ext    SL abd    2x10 B    clamshells    15x B    Standing ER/IR    90/90 walkout YTB w/VC 5x ea B    Hip burners    10x B                    Ther Ex        Supine hip flex str    Standing kneel at chair 1 min    ISIDRO str    2x30s L     Piriformis str    2x30s L    Supine hip flex    W/RTB 2x10 B                                    Ther Activity                        Gait Training                        Modalities

## 2023-10-09 ENCOUNTER — APPOINTMENT (OUTPATIENT)
Dept: PHYSICAL THERAPY | Facility: CLINIC | Age: 73
End: 2023-10-09
Payer: MEDICARE

## 2023-10-12 ENCOUNTER — OFFICE VISIT (OUTPATIENT)
Dept: PHYSICAL THERAPY | Facility: CLINIC | Age: 73
End: 2023-10-12
Payer: MEDICARE

## 2023-10-12 DIAGNOSIS — M25.552 LEFT HIP PAIN: Primary | ICD-10-CM

## 2023-10-12 DIAGNOSIS — M76.892 TENDINITIS OF LEFT HIP FLEXOR: ICD-10-CM

## 2023-10-12 PROCEDURE — 97140 MANUAL THERAPY 1/> REGIONS: CPT

## 2023-10-12 PROCEDURE — 97110 THERAPEUTIC EXERCISES: CPT

## 2023-10-12 NOTE — PROGRESS NOTES
Daily Note     Today's date: 10/12/2023  Patient name: Tennille Coburn  : 1950  MRN: [id number]  Referring provider: Orlando Green MD  Dx:   Encounter Diagnosis     ICD-10-CM    1. Left hip pain  M25.552       2. Tendinitis of left hip flexor  M76.892           Start Time: 1102  Stop Time: 1145  Total time in clinic (min): 43 minutes    Subjective: Pt reports that her hip feels great, she hasn't had any pain in it, continues her HEP, and is able to play pickleball on consecutive days without any pain. Pt states continue discomfort with her R shoulder with snapping pains at times and just doesn't feel "normal". Objective: See treatment diary below      Assessment: Tolerated treatment well. Patient demonstrated fatigue post treatment, exhibited good technique with therapeutic exercises, and would benefit from continued PT. Pt demonstrated rounded shoulders during AROM, when corrected she had less snapping pain. Pt was also tight with IR in her RUE than her LUE. HEP updated based on today's findings. Plan: Continue per plan of care. Progress treatment as tolerated. Precautions: None  HEP: Access Code [facility]    Date   10/12 10/5 10/2/23   Visit Number   3 2 1 (IE)   Manuals        GH mobilizations   Inf & post gr.  2-3                             Neuro Re-Ed         bridges    2x10 LE ext    SL abd    2x10 B    clamshells    15x B    Standing ER/IR    90/90 walkout YTB w/VC 5x ea B    Hip burners    10x B    Standing GH ABC   YTB 1x 2 ways             Ther Ex        Supine hip flex str    Standing kneel at chair 1 min    ISIDRO str    2x30s L     Piriformis str    2x30s L    Supine hip flex    W/RTB 2x10 B    Sleeper str   4x30s B     Standing GH IR str   2x10, 5s hold up the back                     Ther Activity                        Gait Training                        Modalities

## 2023-10-16 ENCOUNTER — OFFICE VISIT (OUTPATIENT)
Dept: PHYSICAL THERAPY | Facility: CLINIC | Age: 73
End: 2023-10-16
Payer: MEDICARE

## 2023-10-16 DIAGNOSIS — M25.552 LEFT HIP PAIN: Primary | ICD-10-CM

## 2023-10-16 DIAGNOSIS — M76.892 TENDINITIS OF LEFT HIP FLEXOR: ICD-10-CM

## 2023-10-16 PROCEDURE — 97110 THERAPEUTIC EXERCISES: CPT

## 2023-10-16 PROCEDURE — 97112 NEUROMUSCULAR REEDUCATION: CPT

## 2023-10-16 NOTE — PROGRESS NOTES
Daily Note     Today's date: 10/16/2023  Patient name: Abiodun Marcano  : 1950  MRN: [id number]  Referring provider: Abdulkadir Green MD  Dx:   Encounter Diagnosis     ICD-10-CM    1. Left hip pain  M25.552       2. Tendinitis of left hip flexor  M76.892           Start Time: 3735  Stop Time: 1100  Total time in clinic (min): 43 minutes    Subjective: Pt reports that she is getting frustrated with her shoulder, she was able to play pickleball without issue, still isn't serving for tennis. She was able to swing a golf club without issue. She cannot lay on her shoulder at night and continues to have pain at the end of the day. Hip continues to feel good. Objective: See treatment diary below      Assessment: Tolerated treatment well. Patient demonstrated fatigue post treatment, exhibited good technique with therapeutic exercises, and would benefit from continued PT. Educated patient on timelines for increasing flexibility, as well as the need to strengthening her shoulder in different ROM with good mechanics and form. Pt continues to hike and roll her shoulders increasing her shoulder irritation. Plan: Continue per plan of care. Progress treatment as tolerated. Precautions: None  HEP: Access Code [facility]    Date  10/16 10/12 10/5 10/2/23   Visit Number  4 3 2 1 (IE)   Manuals        GH mobilizations   Inf & post gr.  2-3                             Neuro Re-Ed         bridges    2x10 LE ext    SL abd    2x10 B    clamshells    15x B    bodyblade  4x30s 90 flex  2x30s 90 flex against wall      Standing ER/IR  ER 90/90 w/3lbs 2x10  90/90 walkout YTB w/VC 5x ea B    Hip burners    10x B    Standing GH ABC   YTB 1x 2 ways     D2 at wall  2x10      Ther Ex        Supine hip flex str    Standing kneel at chair 1 min    ISIDRO str    2x30s L     Piriformis str    2x30s L    Supine hip flex    W/RTB 2x10 B    Sleeper str   4x30s B     Standing GH IR str  2x10, 5s hold up the back 2x10, 5s hold up the back Ther Activity                        Gait Training                        Modalities

## 2023-10-19 ENCOUNTER — APPOINTMENT (OUTPATIENT)
Dept: PHYSICAL THERAPY | Facility: CLINIC | Age: 73
End: 2023-10-19
Payer: MEDICARE

## 2023-10-20 ENCOUNTER — APPOINTMENT (OUTPATIENT)
Dept: PHYSICAL THERAPY | Facility: CLINIC | Age: 73
End: 2023-10-20
Payer: MEDICARE

## 2023-10-23 ENCOUNTER — APPOINTMENT (OUTPATIENT)
Dept: PHYSICAL THERAPY | Facility: CLINIC | Age: 73
End: 2023-10-23
Payer: MEDICARE

## 2024-12-12 ENCOUNTER — EVALUATION (OUTPATIENT)
Dept: PHYSICAL THERAPY | Facility: CLINIC | Age: 74
End: 2024-12-12
Payer: COMMERCIAL

## 2024-12-12 DIAGNOSIS — M54.2 NECK PAIN: ICD-10-CM

## 2024-12-12 DIAGNOSIS — G25.89 SCAPULAR DYSKINESIS: ICD-10-CM

## 2024-12-12 DIAGNOSIS — M54.12 CERVICAL RADICULOPATHY: Primary | ICD-10-CM

## 2024-12-12 PROCEDURE — 97161 PT EVAL LOW COMPLEX 20 MIN: CPT

## 2024-12-12 NOTE — LETTER
2024    Frandy Salguero MD  [address]  [address]  [facility] [address]    Patient: Hazel Mcadams   YOB: 1950   Date of Visit: 2024     Encounter Diagnosis     ICD-10-CM    1. Cervical radiculopathy  M54.12       2. Neck pain  M54.2       3. Scapular dyskinesis  G25.89           Dear Dr. Salguero:    Thank you for your recent referral of Hazel Mcadams. Please review the attached evaluation summary from Hazel's recent visit.     Please verify that you agree with the plan of care by signing the attached order.     If you have any questions or concerns, please do not hesitate to call.     I sincerely appreciate the opportunity to share in the care of one of your patients and hope to have another opportunity to work with you in the near future.       Sincerely,    Cinthya Greenwood, PT      Referring Provider:      I certify that I have read the below Plan of Care and certify the need for these services furnished under this plan of treatment while under my care.                    Frandy Salguero MD  [address]  [address]  [facility] [address]  Via Fax: [phone]          PT Evaluation     Today's date: 2024  Patient name: Hazel Mcadams  : 1950  MRN: [id number]  Referring provider: Tito Green MD  Dx:   Encounter Diagnosis     ICD-10-CM    1. Cervical radiculopathy  M54.12       2. Neck pain  M54.2       3. Scapular dyskinesis  G25.89                      Assessment  Impairments: abnormal coordination, abnormal muscle firing, abnormal muscle tone, abnormal or restricted ROM, abnormal movement, impaired physical strength, lacks appropriate home exercise program, pain with function, weight-bearing intolerance, poor body mechanics, unable to perform ADL, participation limitations and activity limitations  Symptom irritability: low    Assessment details: Patient is a 74 year old female with neck pain. Patient is a  who plays almost everyday. Patient has  deficits with cervical and thoracic hypomobility, cervical ROM. Patient was positive with Spurlings test, and ULTT1. Patient deficits are preventing her from playing recreational sports like tennis. Patient was educated on findings and given HEP. Patient's symptoms are consistent with cervical radiculopathy. Patient would benefit from continued skilled PT to address his deficits.   Understanding of Dx/Px/POC: good     Prognosis: good    Goals  STG 1-4 weeks   1. Patient will be independent with HEP.   2. Patient will be in less than 3/10 pain with ADLS.   3. Patient will increase all planes of LE MMTs by 1  4. Patient will improve his posture.       LTG 6-8 weeks.   1. Patient will increase FOTO score by 10 points.   2. Patient will be able to sleep without pain.   3. Patient will be able to have 5/5 UE strength to help improve with recreational activities.   4. Patient will no longer had numbness and tingling when playing tennis.        Plan  Patient would benefit from: skilled physical therapy    Planned therapy interventions: activity modification, ADL retraining, IASTM, manual therapy, joint mobilization, massage, neuromuscular re-education, nerve gliding, muscle pump exercises, breathing training, body mechanics training, behavior modification, self care, stretching, strengthening, flexibility, gait training, functional ROM exercises, graded activity, graded exercise, graded motor, therapeutic exercise, therapeutic activities, therapeutic training, transfer training, patient/caregiver education and home exercise program    Frequency: 2x week  Duration in weeks: 6  Plan of Care beginning date: 12/12/2024  Plan of Care expiration date: 1/30/2025  Treatment plan discussed with: patient  Plan details: HEP development, stretching, strengthening, A/AA/PROM, joint mobilizations, posture education, body mechanics training for bending and lifting, STM/MI as needed to reduce muscle tension, balance and proprioceptive  training, muscle reeducation, PLOC discussed and agreed upon with patient.       Subjective Evaluation    History of Present Illness  Mechanism of injury: Patient has neck pain that started about a month ago. Patient has had previous RTC surgery in her R shoulder where she was treated here at [facility] in the past. She is a , and enjoys playing pretty much everyday.  She went to the Dr. Elliott on Friday who said she has Cervical radiculopathy. She has a lot of tingling in the R hand throughout the day. She said at times she feels there is a knot in the middle of her shoulder blades. She said that the only things that helps is when she wears a neck collar. She has tried to do some of the exercises she remembered from her RTC like no money. She takes no pain medication.  She wants to try and use a tennis ball as a massage. She is retired. Her goal is to get back tennis as soon as possible. She denies any other passed medical hx.     Quality of life: good    Patient Goals  Patient goals for therapy: decreased edema, decreased pain, improved balance, increased motion, return to sport/leisure activities, independence with ADLs/IADLs and increased strength    Pain  Current pain ratin  At worst pain ratin  Relieving factors: relaxation    Social Support    Employment status: not working  Hand dominance: right    Treatments  Current treatment: physical therapy        Objective     Active Range of Motion   Cervical/Thoracic Spine       Cervical    Flexion:  Restriction level: minimal  Extension:  Restriction level: minimal  Left lateral flexion:  Restriction level: minimal  Right lateral flexion:  Restriction level minimal  Left rotation:  Restriction level: moderate  Right rotation:  Restriction level: moderate    Scapular Mobility     Right Shoulder   Scapular Dyskinesis: grade II  Scapular mobility: fair  Scapular Mobility with Shoulder to 90° FF   Scapular winging: moderate    Joint Play     Right  Shoulder  Hypomobile in the posterior capsule and inferior capsule.     Hypermobile: C5 and C6     Hypomobile: C1, C2, C3, C7, T1 and T2     Strength/Myotome Testing     Left Shoulder     Planes of Motion   Flexion: 5   Abduction: 4+   External rotation at 0°: 5   Internal rotation at 0°: 5     Right Shoulder     Planes of Motion   Flexion: 5   Abduction: 5   External rotation at 0°: 5   Internal rotation at 0°: 5     Tests   Cervical     Right   Positive Spurling's Test A.     Right Shoulder   Positive ULTT1 and cervical rotation lateral flexion.               Insurance:  A/CMS Eval/ Re-eval Auth #/ Referral # Total units or visits Start date  Expiration date KX? Visit limitation?  PT only or  PT+OT? Co-Insurance   CMS 12/12 12/12     no                 POC Start Date POC Expiration Date Signed POC?   12/12 1/30       Date 12/12              Visits/Units: no Used 1              Authed:  Remaining                   Precautions: RTC hx   Access Code: [email or web address]  URL: [email or web address]  Date: 12/12/2024  Prepared by: Cinthya Greenwood    Exercises  - Sidelying Thoracic Rotation with Open Book  - 1 x daily - 7 x weekly - 3 sets - 10 reps  - Seated Upper Trapezius Stretch  - 1 x daily - 7 x weekly - 3 sets - 30s  hold  - Supine Chin Tuck  - 1 x daily - 7 x weekly - 3 sets - 10 reps  - Seated Cervical Rotation AROM  - 1 x daily - 7 x weekly - 3 sets - 10 reps      12/12       IE    Manuals                                   Neuro Re-Ed                                                        Ther Ex                                                               Ther Activity                     Gait Training                     Modalities

## 2024-12-12 NOTE — PROGRESS NOTES
PT Evaluation     Today's date: 2024  Patient name: Hazel Mcadams  : 1950  MRN: [id number]  Referring provider: Tito Green MD  Dx:   Encounter Diagnosis     ICD-10-CM    1. Cervical radiculopathy  M54.12       2. Neck pain  M54.2       3. Scapular dyskinesis  G25.89                      Assessment  Impairments: abnormal coordination, abnormal muscle firing, abnormal muscle tone, abnormal or restricted ROM, abnormal movement, impaired physical strength, lacks appropriate home exercise program, pain with function, weight-bearing intolerance, poor body mechanics, unable to perform ADL, participation limitations and activity limitations  Symptom irritability: low    Assessment details: Patient is a 74 year old female with neck pain. Patient is a  who plays almost everyday. Patient has deficits with cervical and thoracic hypomobility, cervical ROM. Patient was positive with Spurlings test, and ULTT1. Patient deficits are preventing her from playing recreational sports like tennis. Patient was educated on findings and given HEP. Patient's symptoms are consistent with cervical radiculopathy. Patient would benefit from continued skilled PT to address his deficits.   Understanding of Dx/Px/POC: good     Prognosis: good    Goals  STG 1-4 weeks   1. Patient will be independent with HEP.   2. Patient will be in less than 3/10 pain with ADLS.   3. Patient will increase all planes of LE MMTs by 1  4. Patient will improve his posture.       LTG 6-8 weeks.   1. Patient will increase FOTO score by 10 points.   2. Patient will be able to sleep without pain.   3. Patient will be able to have 5/5 UE strength to help improve with recreational activities.   4. Patient will no longer had numbness and tingling when playing tennis.        Plan  Patient would benefit from: skilled physical therapy    Planned therapy interventions: activity modification, ADL retraining, IASTM, manual therapy, joint mobilization,  massage, neuromuscular re-education, nerve gliding, muscle pump exercises, breathing training, body mechanics training, behavior modification, self care, stretching, strengthening, flexibility, gait training, functional ROM exercises, graded activity, graded exercise, graded motor, therapeutic exercise, therapeutic activities, therapeutic training, transfer training, patient/caregiver education and home exercise program    Frequency: 2x week  Duration in weeks: 6  Plan of Care beginning date: 12/12/2024  Plan of Care expiration date: 1/30/2025  Treatment plan discussed with: patient  Plan details: HEP development, stretching, strengthening, A/AA/PROM, joint mobilizations, posture education, body mechanics training for bending and lifting, STM/MI as needed to reduce muscle tension, balance and proprioceptive training, muscle reeducation, PLOC discussed and agreed upon with patient.       Subjective Evaluation    History of Present Illness  Mechanism of injury: Patient has neck pain that started about a month ago. Patient has had previous RTC surgery in her R shoulder where she was treated here at [facility] in the past. She is a , and enjoys playing pretty much everyday.  She went to the Dr. Elliott on Friday who said she has Cervical radiculopathy. She has a lot of tingling in the R hand throughout the day. She said at times she feels there is a knot in the middle of her shoulder blades. She said that the only things that helps is when she wears a neck collar. She has tried to do some of the exercises she remembered from her RTC like no money. She takes no pain medication.  She wants to try and use a tennis ball as a massage. She is retired. Her goal is to get back tennis as soon as possible. She denies any other passed medical hx.     Quality of life: good    Patient Goals  Patient goals for therapy: decreased edema, decreased pain, improved balance, increased motion, return to sport/leisure activities,  independence with ADLs/IADLs and increased strength    Pain  Current pain ratin  At worst pain ratin  Relieving factors: relaxation    Social Support    Employment status: not working  Hand dominance: right    Treatments  Current treatment: physical therapy        Objective     Active Range of Motion   Cervical/Thoracic Spine       Cervical    Flexion:  Restriction level: minimal  Extension:  Restriction level: minimal  Left lateral flexion:  Restriction level: minimal  Right lateral flexion:  Restriction level minimal  Left rotation:  Restriction level: moderate  Right rotation:  Restriction level: moderate    Scapular Mobility     Right Shoulder   Scapular Dyskinesis: grade II  Scapular mobility: fair  Scapular Mobility with Shoulder to 90° FF   Scapular winging: moderate    Joint Play     Right Shoulder  Hypomobile in the posterior capsule and inferior capsule.     Hypermobile: C5 and C6     Hypomobile: C1, C2, C3, C7, T1 and T2     Strength/Myotome Testing     Left Shoulder     Planes of Motion   Flexion: 5   Abduction: 4+   External rotation at 0°: 5   Internal rotation at 0°: 5     Right Shoulder     Planes of Motion   Flexion: 5   Abduction: 5   External rotation at 0°: 5   Internal rotation at 0°: 5     Tests   Cervical     Right   Positive Spurling's Test A.     Right Shoulder   Positive ULTT1 and cervical rotation lateral flexion.               Insurance:  AMA/CMS Eval/ Re-eval Auth #/ Referral # Total units or visits Start date  Expiration date KX? Visit limitation?  PT only or  PT+OT? Co-Insurance   CMS      no                 POC Start Date POC Expiration Date Signed POC?          Date               Visits/Units: no Used 1              Authed:  Remaining                   Precautions: RTC hx   Access Code: [email or web address]  URL: [email or web address]  Date: 2024  Prepared by: Cinthya Greenwood    Exercises  - Sidelying Thoracic Rotation with Open Book  -  1 x daily - 7 x weekly - 3 sets - 10 reps  - Seated Upper Trapezius Stretch  - 1 x daily - 7 x weekly - 3 sets - 30s  hold  - Supine Chin Tuck  - 1 x daily - 7 x weekly - 3 sets - 10 reps  - Seated Cervical Rotation AROM  - 1 x daily - 7 x weekly - 3 sets - 10 reps      12/12       IE    Manuals                                   Neuro Re-Ed                                                        Ther Ex                                                               Ther Activity                     Gait Training                     Modalities

## 2024-12-19 ENCOUNTER — APPOINTMENT (OUTPATIENT)
Dept: PHYSICAL THERAPY | Facility: CLINIC | Age: 74
End: 2024-12-19
Payer: COMMERCIAL

## 2024-12-24 ENCOUNTER — APPOINTMENT (OUTPATIENT)
Dept: PHYSICAL THERAPY | Facility: CLINIC | Age: 74
End: 2024-12-24
Payer: COMMERCIAL

## 2024-12-30 ENCOUNTER — APPOINTMENT (OUTPATIENT)
Dept: PHYSICAL THERAPY | Facility: CLINIC | Age: 74
End: 2024-12-30
Payer: COMMERCIAL